# Patient Record
Sex: MALE | Race: ASIAN | Employment: UNEMPLOYED | URBAN - METROPOLITAN AREA
[De-identification: names, ages, dates, MRNs, and addresses within clinical notes are randomized per-mention and may not be internally consistent; named-entity substitution may affect disease eponyms.]

---

## 2023-01-05 NOTE — ED INITIAL ASSESSMENT (HPI)
Pt reports last time pt used heroine was 12/30/23. Pt states that he snorted heroine 3 times day for the past 9 months. Pt reports not being able to keep anything down, pt reports vomiting frequently the past 24 hrs. Pt denies diarrhea. Pt denies ETOH use or other substance use.

## 2023-01-05 NOTE — DISCHARGE INSTRUCTIONS
Please follow up with [facility] ([phone]), 911, or the nearest emergency room if symptoms persist or worsen, or with any questions or concerns. Please also follow up with the referrals below and/or detox as soon as possible for continued treatment. 2000 [facility] Suicide Prevention Lifeline  8-171-891-TALK (1121)  Chat service available at www.suicidepreventionlifeline. org    Residential and Outpatient Substance and Alcohol Abuse 5002 [facility] 10   [address] [place], [address] [place]  [phone]    [facility]  [address], [facility], [address]  [phone]  -OR-  Francis 2 [place], [address]    Breaking Free   [address]  [place], [address] [place]  [phone]    Rosalvaa Gilmer [facility] GenevieveHenri, [address]  [phone]    [address] [place], [address]  [phone]    [facility]   [address]  [phone]    [place]   845 [facility], [address]  [phone]    8482 [facility] 143  [address] 78  [phone] to 289 [facility]  [address], Residence [place] 3701  [phone]    [facility]AT[facility] [place]   [address] [facility], [address]  [phone]    [address]  [address] [address], [place], 509 [facility]  [phone]    [address]  [address]Radha, [address]  [phone]    Melani Substance Use [address] [facility]  [address] [place], [place], [address]  [phone]    [facility] [address], [address] [place] [address], [address] [place] [address]  [phone]  1310 [facility] [address], [place], [place] [address]  311  [facility] 3970  [address]   [facility] 3  [facility]  [phone]    Top of the [facility]  [address], [place], 175 [facility]  [phone]    MAT Services (all subject to insurance barriers)    Dimensions [address], [address], 855 [facility] [phone]  [place] 1071 [facility],Ground [facility], [place], [address] [phone]  [place] 318 [facility], [address] [place] [address] [phone]  [place] [id number] [phone]  MEDICATION ASSISTED TREATMENT  [address] [place], [address]  Phone [phone]  [facility]:  [facility] [address] [place] [address]. [facility] [address] [facility] . [facility] 17 [address] [facility] 108 [facility]) [address] [place] [id number]   [place] [address] 200 [facility] [address] [facility] 1467 [facility] 1320 [place] [address]   Main Call: [phone]  [facility] Problems)  C[address] Sheela [address]. [address]  P: [phone]  F: [phone]  E: [email or web address]  VNA  Call appointment line at [phone] or [phone]  Extension Krishan Weiner [facility] in Lists of hospitals in the [place]  Address: [address] [facility]. [address] (additional [place] locations)  Phone: [phone]  Art Lopez  [address], [address]  [phone]    [facility]  3305 [facility], [facility], [facility] [address]  [phone]  [facility]   BRASS II  [address]  [place], [facility]0 [address] [address]  [phone]  [facility] 1, [place], [address] [place] [address]  [phone]  -or-   [address] [place] [address] Drive, [place], [address]  [phone]  -or-   [facility], [address] [place]  [phone]  [place] 200 [facility]  [address], [place], [facility]  [phone]  - or -   [address] [facility] Center[facility]ious howard [facility] 1841  [phone]  Above and [facility]  Via [address], 2021 [facility]  [phone]  BONNIE EDDY JR. [address]  [phone]  [place] [address] Thomas Mccl[address], [place] 24  [phone]  [address] [facility]   Darryl 3  [facility]  [phone]

## 2023-01-05 NOTE — ED NOTES
Met with pt for consult regarding [facility] treatment services. Pt reports using heroin (nasally) for past several months. Pt reports motivation to cease use, and reports that he \"stopped on his own\" several days ago (last use 12/30). Pt reports current withdrawal symptoms of pain and GI issues. Discussed treatment options with pt, including MAT services (suboxone and vivitrol), treatment programs (residential vs outpatient), and detox. Pt states that he is not interested in detox at this time, but that he is open to something that would provide long term support and withdrawal symptom management. Encouraged pt to look into MAT services and outpatient program, due to pt's period of sobriety. Pt confirmed understanding and states that he will follow up with referrals provided in discharge instructions upon discharge. Recommended that pt look into outpatient program with [facility], etc and MAT services through [place], etc and stressed the importance of a treatment program in order to get the support and accountability that the program would provide. Pt confirmed understanding and advised he will follow up with this writer with any additional questions or anything else needed. Pt denies SI. Referrals given in pt discharge instructions.

## 2023-12-11 ENCOUNTER — APPOINTMENT (OUTPATIENT)
Dept: CT IMAGING | Facility: HOSPITAL | Age: 33
End: 2023-12-11
Attending: EMERGENCY MEDICINE
Payer: MEDICAID

## 2023-12-11 ENCOUNTER — HOSPITAL ENCOUNTER (OUTPATIENT)
Facility: HOSPITAL | Age: 33
Setting detail: OBSERVATION
Discharge: HOME | End: 2023-12-12
Attending: EMERGENCY MEDICINE | Admitting: HOSPITALIST
Payer: MEDICAID

## 2023-12-11 ENCOUNTER — APPOINTMENT (OUTPATIENT)
Dept: GENERAL RADIOLOGY | Facility: HOSPITAL | Age: 33
End: 2023-12-11
Attending: UROLOGY
Payer: MEDICAID

## 2023-12-11 ENCOUNTER — ANESTHESIA (OUTPATIENT)
Dept: SURGERY | Facility: HOSPITAL | Age: 33
End: 2023-12-11
Payer: MEDICAID

## 2023-12-11 ENCOUNTER — ANESTHESIA EVENT (OUTPATIENT)
Dept: SURGERY | Facility: HOSPITAL | Age: 33
End: 2023-12-11
Payer: MEDICAID

## 2023-12-11 DIAGNOSIS — N20.1 URETEROLITHIASIS: Primary | ICD-10-CM

## 2023-12-11 LAB
ALBUMIN SERPL-MCNC: 4.3 G/DL (ref 3.4–5)
ALBUMIN/GLOB SERPL: 1.5 {RATIO} (ref 1–2)
ALP LIVER SERPL-CCNC: 61 U/L
ALT SERPL-CCNC: 22 U/L
ANION GAP SERPL CALC-SCNC: 2 MMOL/L (ref 0–18)
AST SERPL-CCNC: 10 U/L (ref 15–37)
BASOPHILS # BLD AUTO: 0.03 X10(3) UL (ref 0–0.2)
BASOPHILS NFR BLD AUTO: 0.5 %
BILIRUB SERPL-MCNC: 0.8 MG/DL (ref 0.1–2)
BILIRUB UR QL STRIP.AUTO: NEGATIVE
BUN BLD-MCNC: 10 MG/DL (ref 9–23)
CALCIUM BLD-MCNC: 9 MG/DL (ref 8.5–10.1)
CHLORIDE SERPL-SCNC: 111 MMOL/L (ref 98–112)
CO2 SERPL-SCNC: 29 MMOL/L (ref 21–32)
COLOR UR AUTO: YELLOW
CREAT BLD-MCNC: 0.86 MG/DL
EGFRCR SERPLBLD CKD-EPI 2021: 116 ML/MIN/1.73M2 (ref 60–?)
EOSINOPHIL # BLD AUTO: 0.11 X10(3) UL (ref 0–0.7)
EOSINOPHIL NFR BLD AUTO: 1.9 %
ERYTHROCYTE [DISTWIDTH] IN BLOOD BY AUTOMATED COUNT: 11.6 %
GLOBULIN PLAS-MCNC: 2.9 G/DL (ref 2.8–4.4)
GLUCOSE BLD-MCNC: 94 MG/DL (ref 70–99)
GLUCOSE UR STRIP.AUTO-MCNC: NORMAL MG/DL
HCT VFR BLD AUTO: 41.6 %
HGB BLD-MCNC: 13.9 G/DL
IMM GRANULOCYTES # BLD AUTO: 0.02 X10(3) UL (ref 0–1)
IMM GRANULOCYTES NFR BLD: 0.3 %
KETONES UR STRIP.AUTO-MCNC: NEGATIVE MG/DL
LEUKOCYTE ESTERASE UR QL STRIP.AUTO: NEGATIVE
LYMPHOCYTES # BLD AUTO: 1.56 X10(3) UL (ref 1–4)
LYMPHOCYTES NFR BLD AUTO: 26.7 %
MCH RBC QN AUTO: 28.6 PG (ref 26–34)
MCHC RBC AUTO-ENTMCNC: 33.4 G/DL (ref 31–37)
MCV RBC AUTO: 85.6 FL
MONOCYTES # BLD AUTO: 0.39 X10(3) UL (ref 0.1–1)
MONOCYTES NFR BLD AUTO: 6.7 %
NEUTROPHILS # BLD AUTO: 3.73 X10 (3) UL (ref 1.5–7.7)
NEUTROPHILS # BLD AUTO: 3.73 X10(3) UL (ref 1.5–7.7)
NEUTROPHILS NFR BLD AUTO: 63.9 %
NITRITE UR QL STRIP.AUTO: NEGATIVE
OSMOLALITY SERPL CALC.SUM OF ELEC: 293 MOSM/KG (ref 275–295)
PH UR STRIP.AUTO: 7 [PH] (ref 5–8)
PLATELET # BLD AUTO: 285 10(3)UL (ref 150–450)
POTASSIUM SERPL-SCNC: 4.3 MMOL/L (ref 3.5–5.1)
PROT SERPL-MCNC: 7.2 G/DL (ref 6.4–8.2)
RBC # BLD AUTO: 4.86 X10(6)UL
RBC #/AREA URNS AUTO: >10 /HPF
SODIUM SERPL-SCNC: 142 MMOL/L (ref 136–145)
SP GR UR STRIP.AUTO: 1.02 (ref 1–1.03)
UROBILINOGEN UR STRIP.AUTO-MCNC: 2 MG/DL
WBC # BLD AUTO: 5.8 X10(3) UL (ref 4–11)

## 2023-12-11 PROCEDURE — 74176 CT ABD & PELVIS W/O CONTRAST: CPT | Performed by: EMERGENCY MEDICINE

## 2023-12-11 PROCEDURE — 0T778DZ DILATION OF LEFT URETER WITH INTRALUMINAL DEVICE, VIA NATURAL OR ARTIFICIAL OPENING ENDOSCOPIC: ICD-10-PCS | Performed by: UROLOGY

## 2023-12-11 PROCEDURE — 0TF78ZZ FRAGMENTATION IN LEFT URETER, VIA NATURAL OR ARTIFICIAL OPENING ENDOSCOPIC: ICD-10-PCS | Performed by: UROLOGY

## 2023-12-11 PROCEDURE — BT1F0ZZ FLUOROSCOPY OF LEFT KIDNEY, URETER AND BLADDER USING HIGH OSMOLAR CONTRAST: ICD-10-PCS | Performed by: UROLOGY

## 2023-12-11 PROCEDURE — 99222 1ST HOSP IP/OBS MODERATE 55: CPT | Performed by: HOSPITALIST

## 2023-12-11 DEVICE — URETERAL STENT
Type: IMPLANTABLE DEVICE | Site: URETER | Status: FUNCTIONAL
Brand: ASCERTA™

## 2023-12-11 RX ORDER — HYDROCODONE BITARTRATE AND ACETAMINOPHEN 5; 325 MG/1; MG/1
2 TABLET ORAL ONCE AS NEEDED
Status: DISCONTINUED | OUTPATIENT
Start: 2023-12-11 | End: 2023-12-11 | Stop reason: HOSPADM

## 2023-12-11 RX ORDER — MELATONIN
3 NIGHTLY PRN
Status: DISCONTINUED | OUTPATIENT
Start: 2023-12-11 | End: 2023-12-12

## 2023-12-11 RX ORDER — SODIUM CHLORIDE, SODIUM LACTATE, POTASSIUM CHLORIDE, CALCIUM CHLORIDE 600; 310; 30; 20 MG/100ML; MG/100ML; MG/100ML; MG/100ML
INJECTION, SOLUTION INTRAVENOUS CONTINUOUS
Status: DISCONTINUED | OUTPATIENT
Start: 2023-12-11 | End: 2023-12-11 | Stop reason: HOSPADM

## 2023-12-11 RX ORDER — HYDROMORPHONE HYDROCHLORIDE 1 MG/ML
0.6 INJECTION, SOLUTION INTRAMUSCULAR; INTRAVENOUS; SUBCUTANEOUS EVERY 5 MIN PRN
Status: DISCONTINUED | OUTPATIENT
Start: 2023-12-11 | End: 2023-12-11 | Stop reason: HOSPADM

## 2023-12-11 RX ORDER — ACETAMINOPHEN 500 MG
1000 TABLET ORAL ONCE AS NEEDED
Status: DISCONTINUED | OUTPATIENT
Start: 2023-12-11 | End: 2023-12-11 | Stop reason: HOSPADM

## 2023-12-11 RX ORDER — PROCHLORPERAZINE EDISYLATE 5 MG/ML
5 INJECTION INTRAMUSCULAR; INTRAVENOUS EVERY 8 HOURS PRN
Status: DISCONTINUED | OUTPATIENT
Start: 2023-12-11 | End: 2023-12-11 | Stop reason: HOSPADM

## 2023-12-11 RX ORDER — MEPERIDINE HYDROCHLORIDE 25 MG/ML
12.5 INJECTION INTRAMUSCULAR; INTRAVENOUS; SUBCUTANEOUS AS NEEDED
Status: DISCONTINUED | OUTPATIENT
Start: 2023-12-11 | End: 2023-12-11 | Stop reason: HOSPADM

## 2023-12-11 RX ORDER — SODIUM CHLORIDE, SODIUM LACTATE, POTASSIUM CHLORIDE, CALCIUM CHLORIDE 600; 310; 30; 20 MG/100ML; MG/100ML; MG/100ML; MG/100ML
INJECTION, SOLUTION INTRAVENOUS CONTINUOUS
Status: DISCONTINUED | OUTPATIENT
Start: 2023-12-11 | End: 2023-12-12

## 2023-12-11 RX ORDER — LIDOCAINE HYDROCHLORIDE 20 MG/ML
JELLY TOPICAL AS NEEDED
Status: DISCONTINUED | OUTPATIENT
Start: 2023-12-11 | End: 2023-12-11 | Stop reason: HOSPADM

## 2023-12-11 RX ORDER — HYDROCODONE BITARTRATE AND ACETAMINOPHEN 5; 325 MG/1; MG/1
1 TABLET ORAL ONCE AS NEEDED
Status: DISCONTINUED | OUTPATIENT
Start: 2023-12-11 | End: 2023-12-11 | Stop reason: HOSPADM

## 2023-12-11 RX ORDER — ECHINACEA PURPUREA EXTRACT 125 MG
1 TABLET ORAL
Status: DISCONTINUED | OUTPATIENT
Start: 2023-12-11 | End: 2023-12-12

## 2023-12-11 RX ORDER — OXYBUTYNIN CHLORIDE 5 MG/1
5 TABLET ORAL EVERY 6 HOURS PRN
Status: DISCONTINUED | OUTPATIENT
Start: 2023-12-11 | End: 2023-12-12

## 2023-12-11 RX ORDER — DIPHENHYDRAMINE HYDROCHLORIDE 50 MG/ML
12.5 INJECTION INTRAMUSCULAR; INTRAVENOUS AS NEEDED
Status: DISCONTINUED | OUTPATIENT
Start: 2023-12-11 | End: 2023-12-11 | Stop reason: HOSPADM

## 2023-12-11 RX ORDER — ENEMA 19; 7 G/133ML; G/133ML
1 ENEMA RECTAL ONCE AS NEEDED
Status: DISCONTINUED | OUTPATIENT
Start: 2023-12-11 | End: 2023-12-12

## 2023-12-11 RX ORDER — ONDANSETRON 2 MG/ML
4 INJECTION INTRAMUSCULAR; INTRAVENOUS EVERY 6 HOURS PRN
Status: DISCONTINUED | OUTPATIENT
Start: 2023-12-11 | End: 2023-12-11 | Stop reason: HOSPADM

## 2023-12-11 RX ORDER — HYDROMORPHONE HYDROCHLORIDE 1 MG/ML
0.4 INJECTION, SOLUTION INTRAMUSCULAR; INTRAVENOUS; SUBCUTANEOUS EVERY 5 MIN PRN
Status: DISCONTINUED | OUTPATIENT
Start: 2023-12-11 | End: 2023-12-11 | Stop reason: HOSPADM

## 2023-12-11 RX ORDER — HYDROMORPHONE HYDROCHLORIDE 1 MG/ML
INJECTION, SOLUTION INTRAMUSCULAR; INTRAVENOUS; SUBCUTANEOUS
Status: COMPLETED
Start: 2023-12-11 | End: 2023-12-11

## 2023-12-11 RX ORDER — PHENAZOPYRIDINE HYDROCHLORIDE 100 MG/1
200 TABLET, FILM COATED ORAL 3 TIMES DAILY PRN
Status: DISCONTINUED | OUTPATIENT
Start: 2023-12-11 | End: 2023-12-12

## 2023-12-11 RX ORDER — MIDAZOLAM HYDROCHLORIDE 1 MG/ML
1 INJECTION INTRAMUSCULAR; INTRAVENOUS EVERY 5 MIN PRN
Status: DISCONTINUED | OUTPATIENT
Start: 2023-12-11 | End: 2023-12-11 | Stop reason: HOSPADM

## 2023-12-11 RX ORDER — DIAZEPAM 5 MG/1
5 TABLET ORAL EVERY 8 HOURS PRN
Status: DISCONTINUED | OUTPATIENT
Start: 2023-12-11 | End: 2023-12-12

## 2023-12-11 RX ORDER — BISACODYL 10 MG
10 SUPPOSITORY, RECTAL RECTAL
Status: DISCONTINUED | OUTPATIENT
Start: 2023-12-11 | End: 2023-12-12

## 2023-12-11 RX ORDER — NALOXONE HYDROCHLORIDE 0.4 MG/ML
0.08 INJECTION, SOLUTION INTRAMUSCULAR; INTRAVENOUS; SUBCUTANEOUS AS NEEDED
Status: DISCONTINUED | OUTPATIENT
Start: 2023-12-11 | End: 2023-12-11 | Stop reason: HOSPADM

## 2023-12-11 RX ORDER — GLYCOPYRROLATE 0.2 MG/ML
0.2 INJECTION, SOLUTION INTRAMUSCULAR; INTRAVENOUS ONCE
Status: COMPLETED | OUTPATIENT
Start: 2023-12-11 | End: 2023-12-11

## 2023-12-11 RX ORDER — HYDRALAZINE HYDROCHLORIDE 20 MG/ML
10 INJECTION INTRAMUSCULAR; INTRAVENOUS EVERY 10 MIN PRN
Status: COMPLETED | OUTPATIENT
Start: 2023-12-11 | End: 2023-12-11

## 2023-12-11 RX ORDER — CEFAZOLIN SODIUM/WATER 2 G/20 ML
2 SYRINGE (ML) INTRAVENOUS EVERY 8 HOURS
Qty: 40 ML | Refills: 0 | Status: COMPLETED | OUTPATIENT
Start: 2023-12-12 | End: 2023-12-12

## 2023-12-11 RX ORDER — POLYETHYLENE GLYCOL 3350 17 G/17G
17 POWDER, FOR SOLUTION ORAL DAILY PRN
Status: DISCONTINUED | OUTPATIENT
Start: 2023-12-11 | End: 2023-12-12

## 2023-12-11 RX ORDER — ACETAMINOPHEN 325 MG/1
650 TABLET ORAL EVERY 4 HOURS PRN
Status: DISCONTINUED | OUTPATIENT
Start: 2023-12-11 | End: 2023-12-12

## 2023-12-11 RX ORDER — CEFAZOLIN SODIUM/WATER 2 G/20 ML
2 SYRINGE (ML) INTRAVENOUS
Status: COMPLETED | OUTPATIENT
Start: 2023-12-11 | End: 2023-12-11

## 2023-12-11 RX ORDER — PHENYLEPHRINE HCL 10 MG/ML
VIAL (ML) INJECTION AS NEEDED
Status: DISCONTINUED | OUTPATIENT
Start: 2023-12-11 | End: 2023-12-11 | Stop reason: SURG

## 2023-12-11 RX ORDER — ONDANSETRON 2 MG/ML
4 INJECTION INTRAMUSCULAR; INTRAVENOUS ONCE
Status: COMPLETED | OUTPATIENT
Start: 2023-12-11 | End: 2023-12-11

## 2023-12-11 RX ORDER — MORPHINE SULFATE 2 MG/ML
2 INJECTION, SOLUTION INTRAMUSCULAR; INTRAVENOUS EVERY 2 HOUR PRN
Status: DISCONTINUED | OUTPATIENT
Start: 2023-12-11 | End: 2023-12-12

## 2023-12-11 RX ORDER — HYDROMORPHONE HYDROCHLORIDE 1 MG/ML
1 INJECTION, SOLUTION INTRAMUSCULAR; INTRAVENOUS; SUBCUTANEOUS EVERY 30 MIN PRN
Status: DISCONTINUED | OUTPATIENT
Start: 2023-12-11 | End: 2023-12-12

## 2023-12-11 RX ORDER — HYDROMORPHONE HYDROCHLORIDE 1 MG/ML
0.2 INJECTION, SOLUTION INTRAMUSCULAR; INTRAVENOUS; SUBCUTANEOUS EVERY 5 MIN PRN
Status: DISCONTINUED | OUTPATIENT
Start: 2023-12-11 | End: 2023-12-11 | Stop reason: HOSPADM

## 2023-12-11 RX ORDER — ZOLPIDEM TARTRATE 5 MG/1
5 TABLET ORAL NIGHTLY PRN
Status: DISCONTINUED | OUTPATIENT
Start: 2023-12-11 | End: 2023-12-12

## 2023-12-11 RX ORDER — KETOROLAC TROMETHAMINE 15 MG/ML
15 INJECTION, SOLUTION INTRAMUSCULAR; INTRAVENOUS EVERY 8 HOURS PRN
Status: DISCONTINUED | OUTPATIENT
Start: 2023-12-11 | End: 2023-12-12

## 2023-12-11 RX ORDER — LIDOCAINE HYDROCHLORIDE 10 MG/ML
INJECTION, SOLUTION EPIDURAL; INFILTRATION; INTRACAUDAL; PERINEURAL AS NEEDED
Status: DISCONTINUED | OUTPATIENT
Start: 2023-12-11 | End: 2023-12-11 | Stop reason: SURG

## 2023-12-11 RX ORDER — HYDRALAZINE HYDROCHLORIDE 20 MG/ML
INJECTION INTRAMUSCULAR; INTRAVENOUS
Status: COMPLETED
Start: 2023-12-11 | End: 2023-12-11

## 2023-12-11 RX ORDER — MORPHINE SULFATE 2 MG/ML
1 INJECTION, SOLUTION INTRAMUSCULAR; INTRAVENOUS EVERY 2 HOUR PRN
Status: DISCONTINUED | OUTPATIENT
Start: 2023-12-11 | End: 2023-12-12

## 2023-12-11 RX ORDER — HYDROCODONE BITARTRATE AND ACETAMINOPHEN 5; 325 MG/1; MG/1
2 TABLET ORAL EVERY 4 HOURS PRN
Status: DISCONTINUED | OUTPATIENT
Start: 2023-12-11 | End: 2023-12-12

## 2023-12-11 RX ORDER — PROCHLORPERAZINE EDISYLATE 5 MG/ML
5 INJECTION INTRAMUSCULAR; INTRAVENOUS EVERY 8 HOURS PRN
Status: DISCONTINUED | OUTPATIENT
Start: 2023-12-11 | End: 2023-12-12

## 2023-12-11 RX ORDER — MORPHINE SULFATE 4 MG/ML
4 INJECTION, SOLUTION INTRAMUSCULAR; INTRAVENOUS EVERY 2 HOUR PRN
Status: DISCONTINUED | OUTPATIENT
Start: 2023-12-11 | End: 2023-12-12

## 2023-12-11 RX ORDER — ONDANSETRON 2 MG/ML
4 INJECTION INTRAMUSCULAR; INTRAVENOUS EVERY 6 HOURS PRN
Status: DISCONTINUED | OUTPATIENT
Start: 2023-12-11 | End: 2023-12-12

## 2023-12-11 RX ORDER — DIPHENHYDRAMINE HYDROCHLORIDE 50 MG/ML
INJECTION INTRAMUSCULAR; INTRAVENOUS AS NEEDED
Status: DISCONTINUED | OUTPATIENT
Start: 2023-12-11 | End: 2023-12-11 | Stop reason: SURG

## 2023-12-11 RX ORDER — ACETAMINOPHEN 500 MG
1000 TABLET ORAL EVERY 4 HOURS PRN
Status: DISCONTINUED | OUTPATIENT
Start: 2023-12-11 | End: 2023-12-12

## 2023-12-11 RX ORDER — GLYCOPYRROLATE 0.2 MG/ML
INJECTION, SOLUTION INTRAMUSCULAR; INTRAVENOUS
Status: COMPLETED
Start: 2023-12-11 | End: 2023-12-11

## 2023-12-11 RX ORDER — SENNOSIDES 8.6 MG
17.2 TABLET ORAL NIGHTLY PRN
Status: DISCONTINUED | OUTPATIENT
Start: 2023-12-11 | End: 2023-12-12

## 2023-12-11 RX ORDER — KETOROLAC TROMETHAMINE 15 MG/ML
15 INJECTION, SOLUTION INTRAMUSCULAR; INTRAVENOUS ONCE
Status: COMPLETED | OUTPATIENT
Start: 2023-12-11 | End: 2023-12-11

## 2023-12-11 RX ORDER — DEXAMETHASONE SODIUM PHOSPHATE 4 MG/ML
VIAL (ML) INJECTION AS NEEDED
Status: DISCONTINUED | OUTPATIENT
Start: 2023-12-11 | End: 2023-12-11 | Stop reason: SURG

## 2023-12-11 RX ORDER — HYDROCODONE BITARTRATE AND ACETAMINOPHEN 5; 325 MG/1; MG/1
1 TABLET ORAL EVERY 4 HOURS PRN
Status: DISCONTINUED | OUTPATIENT
Start: 2023-12-11 | End: 2023-12-12

## 2023-12-11 RX ADMIN — PHENYLEPHRINE HCL 100 MCG: 10 MG/ML VIAL (ML) INJECTION at 20:40:00

## 2023-12-11 RX ADMIN — SODIUM CHLORIDE, SODIUM LACTATE, POTASSIUM CHLORIDE, CALCIUM CHLORIDE: 600; 310; 30; 20 INJECTION, SOLUTION INTRAVENOUS at 20:12:00

## 2023-12-11 RX ADMIN — ONDANSETRON 4 MG: 2 INJECTION INTRAMUSCULAR; INTRAVENOUS at 21:03:00

## 2023-12-11 RX ADMIN — LIDOCAINE HYDROCHLORIDE 50 MG: 10 INJECTION, SOLUTION EPIDURAL; INFILTRATION; INTRACAUDAL; PERINEURAL at 20:15:00

## 2023-12-11 RX ADMIN — CEFAZOLIN SODIUM/WATER 2 G: 2 G/20 ML SYRINGE (ML) INTRAVENOUS at 20:19:00

## 2023-12-11 RX ADMIN — SODIUM CHLORIDE, SODIUM LACTATE, POTASSIUM CHLORIDE, CALCIUM CHLORIDE: 600; 310; 30; 20 INJECTION, SOLUTION INTRAVENOUS at 21:19:00

## 2023-12-11 RX ADMIN — CEFAZOLIN SODIUM/WATER: 2 G/20 ML SYRINGE (ML) INTRAVENOUS at 21:19:00

## 2023-12-11 RX ADMIN — DIPHENHYDRAMINE HYDROCHLORIDE 12.5 MG: 50 INJECTION INTRAMUSCULAR; INTRAVENOUS at 20:15:00

## 2023-12-11 RX ADMIN — DEXAMETHASONE SODIUM PHOSPHATE 4 MG: 4 MG/ML VIAL (ML) INJECTION at 20:15:00

## 2023-12-11 NOTE — ED INITIAL ASSESSMENT (HPI)
Pt arrives here with L flank pain x 2 weeks and blood in urine x 1 week. +nausea, denies fevers or chills.

## 2023-12-11 NOTE — ED QUICK NOTES
Orders for admission, patient is aware of plan and ready to go upstairs. Any questions, please call ED RN Fernanda Singh at extension [phone].      Patient Covid vaccination status: Fully vaccinated     COVID Test Ordered in ED: None    COVID Suspicion at Admission: N/A    Running Infusions:      Mental Status/LOC at time of transport: A&Ox4    Other pertinent information:   CIWA score: N/A   NIH score:  N/A

## 2023-12-11 NOTE — PROGRESS NOTES
NURSING ADMISSION NOTE      Patient admitted via Cart  Oriented to room. Safety precautions initiated. Bed in low position. Call light in reach. A&O x4. Denies any CP, IRMA, or calf pain at present. Lungs clear bilaterally. Abdomen soft, nontender, flat. Bowel sounds active, denies any nausea at this time. NPO. DTV. Pt reports pain is tolerable at this time, denies any need for pain medication. POC discussed and pt verbalizes understanding. Pt resting in bed, call light within reach, safety precautions in place.

## 2023-12-12 VITALS
WEIGHT: 115 LBS | BODY MASS INDEX: 18.05 KG/M2 | HEIGHT: 67 IN | RESPIRATION RATE: 16 BRPM | OXYGEN SATURATION: 100 % | HEART RATE: 54 BPM | TEMPERATURE: 99 F | DIASTOLIC BLOOD PRESSURE: 83 MMHG | SYSTOLIC BLOOD PRESSURE: 121 MMHG

## 2023-12-12 PROCEDURE — 99238 HOSP IP/OBS DSCHRG MGMT 30/<: CPT | Performed by: HOSPITALIST

## 2023-12-12 RX ORDER — HYDROCODONE BITARTRATE AND ACETAMINOPHEN 5; 325 MG/1; MG/1
1 TABLET ORAL EVERY 6 HOURS PRN
Qty: 6 TABLET | Refills: 0 | Status: SHIPPED | OUTPATIENT
Start: 2023-12-12

## 2023-12-12 RX ORDER — PHENAZOPYRIDINE HYDROCHLORIDE 200 MG/1
200 TABLET, FILM COATED ORAL 3 TIMES DAILY PRN
Qty: 15 TABLET | Refills: 0 | Status: SHIPPED | OUTPATIENT
Start: 2023-12-12

## 2023-12-12 NOTE — BRIEF OP NOTE
Pre-Operative Diagnosis: Ureteral calculi [N20.1]     Post-Operative Diagnosis: Ureteral calculi [N20.1]      Procedure Performed:   CYSTOSCOPY.  LEFT  URETEROSCOPY,  RETROGRADE PYELOGRAM, LASER LITHOTRIPSY, INSERTION LEFT URETERAL STENT, fluoroscopy    Surgeon(s) and Role:     Magnolia Zapata MD - Primary    Assistant(s):        Surgical Findings: successful laser litho     Specimen: none     Estimated Blood Loss: None        Maude Ambriz MD  12/11/2023  10:13 PM

## 2023-12-12 NOTE — PLAN OF CARE
Arrived back to unit from PACU at 2300. A&Ox4. VSS. RA. . Denies chest pain and SOB. Abdomen soft, non-distended. Denies nausea. Voids- red tinged and straining all urine. Dangler present. Pain managed with PRN pain medications per MAR. Up with standby assist. Tolerating diet well. IVF running per order. See flowsheets for full assessment. All appropriate safety measures in place. All questions and concerns addressed.

## 2023-12-12 NOTE — DISCHARGE PLANNING
Pt discharged from unit - [facility] is here for    IV removed with no difficulties   Went over AVS/plan in detail   PT to follow up with Urology RN to have string stent removed on Friday the 15th     No future appointments.

## 2023-12-12 NOTE — PLAN OF CARE
A&Ox4. VSS. RA. . Denies chest pain and SOB. GI: Abdomen soft, nondistended. Passing gas. Belching present. Denies nausea. : Voids into urinal - no visible clots present. Pain controlled with PRN pain medications. Up with standby assist.   Drains: None - string stent in place   Incisions: None  Diet: General diet - tolerating well   IVF running per order. All appropriate safety measures in place. All questions and concerns addressed.

## 2023-12-17 NOTE — DISCHARGE SUMMARY
Nguyen Espinoza HOSPITALIST  DISCHARGE SUMMARY     Jaida Rae Patient Status:  Observation    1990 MRN [id number]   [place] 3NW-A Attending Irish Stephens MD   Hosp Day # 0 PCP None Pcp     Date of Admission:  2023  Date of Discharge:   2023    Discharge Disposition: Home or Self Care    Discharge Diagnosis:    #Renal colic due to L Nephrolithiasis w/ mild left hydronephrosis   #s/p AVR         History of Present Illness:    Melany Ying is a 26year old male with a past medical history of kidney stones. 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 He comes to the ED due to flank pain and is found to have a kidney stone. Brief Synopsis:    The patient was admitted secondary renal colic from obstructing left nephrolithiasis and mild left hydronephrosis. He was seen by urology service and had cystoscopy with left RG PG and left URS and nephroscopy with laser lithotripsy and insertion of left ureteral stent on . He was stable afterwards and was discharged home plans to follow-up with urology as an outpatient. Lace+ Score: 21  59-90 High Risk  29-58 Medium Risk  0-28   Low Risk       TCM Follow-Up Recommendation:  LACE < 29: Low Risk of readmission after discharge from the hospital. No TCM follow-up needed. Procedures during hospitalization:   Cysto and stent      Consultants:      Discharge Medication List:     Discharge Medications        START taking these medications        Instructions Prescription details   HYDROcodone-acetaminophen 5-325 MG Tabs  Commonly known as: Norco      Take 1 tablet by mouth every 6 (six) hours as needed. Quantity: 6 tablet  Refills: 0     phenazopyridine 200 MG Tabs  Commonly known as: Pyridum      Take 1 tablet (200 mg total) by mouth 3 (three) times daily as needed.    Quantity: 15 tablet  Refills: 0            CONTINUE taking these medications        Instructions Prescription details   aspirin 81 MG Tbec      Take 1 tablet (81 mg total) by mouth daily. Refills: 0               Where to Get Your Medications        These medications were sent to [place] [address] [address] [place] [address], 4110 [facility], [phone], [phone]  [address] [address]      Phone: [phone]   HYDROcodone-acetaminophen 5-325 MG Tabs  phenazopyridine 200 MG Tabs         [place]    Follow-up appointment:   Nurse, Jessica Sumner  [address] [place] [address]  [phone]    Schedule an appointment as soon as possible for a visit on 12/15/2023  dangler stent removal      Vital signs:       Physical Exam:    General: No acute distress   Lungs: clear to auscultation  Cardiovascular: S1, S2  Abdomen: Soft      -----------------------------------------------------------------------------------------------  PATIENT DISCHARGE INSTRUCTIONS: See electronic chart    Santosh Main MD    Total minutes spent on discharge plannin      The [facility] makes medical notes like these available to patients in the interest of transparency. Please be advised this is a medical document. Medical documents are intended to carry relevant information, facts as evident, and the clinical opinion of the practitioner. The medical note is intended as peer to peer communication and may appear blunt or direct. It is written in medical language and may contain abbreviations or verbiage that are unfamiliar.

## 2024-07-27 ENCOUNTER — HOSPITAL ENCOUNTER (OUTPATIENT)
Dept: CV DIAGNOSTICS | Facility: HOSPITAL | Age: 34
Discharge: HOME | End: 2024-07-27
Attending: INTERNAL MEDICINE
Payer: MEDICAID

## 2024-07-27 DIAGNOSIS — Z95.2 S/P AVR: ICD-10-CM

## 2024-07-27 PROCEDURE — 93306 TTE W/DOPPLER COMPLETE: CPT | Performed by: INTERNAL MEDICINE

## (undated) DEVICE — SEAL BIOPSY PORT ACMI

## (undated) DEVICE — SINGLE-USE DIGITAL FLEXIBLE URETEROSCOPE: Brand: LITHOVUE

## (undated) DEVICE — STERILE POLYISOPRENE POWDER-FREE SURGICAL GLOVES: Brand: PROTEXIS

## (undated) DEVICE — ZIPWIRE GUIDEWIRE .038X150 STR

## (undated) DEVICE — FIBER LSR 365UM 6J 80HZ 120W DL FOR LITHO

## (undated) DEVICE — 3M™ TEGADERM™ TRANSPARENT FILM DRESSING, 1626W, 4 IN X 4-3/4 IN (10 CM X 12 CM), 50 EACH/CARTON, 4 CARTON/CASE: Brand: 3M™ TEGADERM™

## (undated) DEVICE — SKN PREP SPNG STKS PVP PNT STR: Brand: MEDLINE INDUSTRIES, INC.

## (undated) DEVICE — SOLUTION IRRIG 3000ML 0.9% NACL FLX CONT

## (undated) DEVICE — TIGERTAIL 5F FLXTIP 70CM

## (undated) DEVICE — SLEEVE COMPR M KNEE LEN SGL USE KENDALL SCD

## (undated) DEVICE — CYSTO CDS-LF: Brand: MEDLINE INDUSTRIES, INC.